# Patient Record
Sex: FEMALE | Race: WHITE
[De-identification: names, ages, dates, MRNs, and addresses within clinical notes are randomized per-mention and may not be internally consistent; named-entity substitution may affect disease eponyms.]

---

## 2019-04-14 ENCOUNTER — HOSPITAL ENCOUNTER (EMERGENCY)
Dept: HOSPITAL 7 - FB.ED | Age: 64
Discharge: HOME | End: 2019-04-14
Payer: COMMERCIAL
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
## 2019-04-14 DIAGNOSIS — S92.491A: Primary | ICD-10-CM

## 2019-04-14 NOTE — EDM.PDOC
ED HPI GENERAL MEDICAL PROBLEM





- General


Chief Complaint: Lower Extremity Injury/Pain


Stated Complaint: FALL


Time Seen by Provider: 04/14/19 14:45


Source of Information: Reports: Patient, Family


History Limitations: Reports: No Limitations





- History of Present Illness


INITIAL COMMENTS - FREE TEXT/NARRATIVE: 





64 y.o.w.f came with her  to the ed after she injured her right big toe, 

being concerned about a fx. Pt is able to move the toe but it hurst to move it. 

Pt did not take painmeds PTA. No other acute med issues. /95 RR 18 Pulse 

ox 97% von RA Pulse 87 Temp 36.7


Onset Date: 04/14/19


Onset Time: 14:00


Duration: Hour(s):


Location: Reports: Lower Extremity, Right (big toe)


Quality: Reports: Burning, Dull


Severity: Mild


Improves with: Reports: Rest


Worsens with: Reports: Movement


Context: Reports: Trauma


Associated Symptoms: Reports: No Other Symptoms





- Related Data


 Allergies











Allergy/AdvReac Type Severity Reaction Status Date / Time


 


No Known Allergies Allergy   Verified 04/14/19 14:50











Home Meds: 


 Home Meds





Citalopram Hydrobromide [Celexa] 10 mg PO DAILY 04/14/19 [History]


amLODIPine [Norvasc] 5 mg PO DAILY 04/14/19 [History]











Review of Systems





- Review of Systems


Review Of Systems: See Below


Constitutional: Reports: No Symptoms


Eyes: Reports: No Symptoms


Ears: Reports: No Symptoms


Nose: Reports: No Symptoms


Mouth/Throat: Reports: No Symptoms


Respiratory: Reports: No Symptoms


Cardiovascular: Reports: No Symptoms


GI/Abdominal: Reports: No Symptoms


Genitourinary: Reports: No Symptoms


Musculoskeletal: Reports: Other (big toe pain)


Skin: Reports: No Symptoms


Neurological: Reports: No Symptoms


Psychiatric: Reports: No Symptoms





ED EXAM, GENERAL





- Physical Exam


Exam: See Below


Exam Limited By: No Limitations


General Appearance: Alert, WD/WN, Mild Distress


Eye Exam: Bilateral Eye: Normal Inspection


Ears: Normal External Exam


Ear Exam: Bilateral Ear: Auricle Normal


Nose: Normal Inspection, Normal Mucosa


Throat/Mouth: Normal Inspection, Normal Lips, Normal Voice, No Airway Compromise


Head: Atraumatic, Normocephalic


Neck: Normal Inspection, Supple, Non-Tender, Full Range of Motion


Respiratory/Chest: No Respiratory Distress, Lungs Clear, Normal Breath Sounds, 

No Accessory Muscle Use, Chest Non-Tender


Cardiovascular: Normal Peripheral Pulses, Regular Rate, Rhythm, No Edema


GI/Abdominal: Normal Bowel Sounds


 (Female) Exam: Deferred


Rectal (Female) Exam: Deferred


Back Exam: Normal Inspection, Full Range of Motion


Extremities: Normal Inspection, Normal Capillary Refill, Other (tender right 

big toe)


Neurological: Alert, Oriented, CN II-XII Intact, Normal Cognition, Abnormal 

Gait (due to right big toe pain)


Psychiatric: Normal Affect, Normal Mood


Skin Exam: Warm, Dry, Intact, Normal Color, No Rash


Lymphatic: No Adenopathy





Course





- Vital Signs


Text/Narrative:: 





64 y.o.w.f came with her  to the ed after she injured her right big toe, 

being concerned about a fx. Pt is able to move the toe but it hurst to move it. 

Pt did not take painmeds PTA. No other acute med issues. /95 RR 18 Pulse 

ox 97% von RA Pulse 87 Temp 36.7


PE: WDWN W F with right  big toe pain


Imaging: Right toe: Nondisplaced fx of middle phalanx


Impression: Fx middle phalanx right big toe, closed


Tx: Ice, Motrin


Reexam: Pt was to walk on her heals, did not want crutches


Plan: D/C with instructions


Last Recorded V/S: 


 Last Vital Signs











Temp  36.9 C   04/14/19 14:50


 


Pulse  73   04/14/19 15:53


 


Resp  17   04/14/19 15:53


 


BP  145/72 H  04/14/19 15:53


 


Pulse Ox  99   04/14/19 15:53














- Orders/Labs/Meds


Orders: 


 Active Orders 24 hr











 Category Date Time Status


 


 Cooling Warming Measures [RC] ASDIRECTED Care  04/14/19 15:21 Active


 


 Toes Great Toe Rt T5 [CR] Stat Exams  04/14/19 15:20 Taken


 


 Ice Bag [Ice Therapy] [OM.PC] Routine Oth  04/14/19 15:21 Ordered











Meds: 


Medications














Discontinued Medications














Generic Name Dose Route Start Last Admin





  Trade Name Freq  PRN Reason Stop Dose Admin


 


Ibuprofen  600 mg  04/14/19 15:47  04/14/19 16:00





  Motrin  PO  04/14/19 15:48  Not Given





  ONETIME STA   





     





     





     





     














Departure





- Departure


Time of Disposition: 15:44


Disposition: Home, Self-Care 01


Condition: Good


Clinical Impression: 


 Toe fracture, right








- Discharge Information


Instructions:  RICE for Routine Care of Injuries


Referrals: 


Anuradha Lockwood NP [Primary Care Provider] - 


Forms:  ED Department Discharge


Additional Instructions: 


Please take Motrin for pain, Ice to the affected area, please walk on your 

heals if possible, please f/u, come back if your symptoms get worse acutely.





- My Orders


Last 24 Hours: 


My Active Orders





04/14/19 15:20


Toes Great Toe Rt T5 [CR] Stat 





04/14/19 15:21


Cooling Warming Measures [RC] ASDIRECTED 


Ice Bag [Ice Therapy] [OM.PC] Routine 














- Assessment/Plan


Last 24 Hours: 


My Active Orders





04/14/19 15:20


Toes Great Toe Rt T5 [CR] Stat 





04/14/19 15:21


Cooling Warming Measures [RC] ASDIRECTED 


Ice Bag [Ice Therapy] [OM.PC] Routine

## 2019-04-15 NOTE — CR
INDICATION:  Trauma.



RIGHT GREAT TOE:  Three views of the right great toe with four images were 
obtained 04/14/19 - no comparisons.



There appears to be a transverse fracture through the distal metaphysis of the 
proximal phalanx of the great toe.  This is only seen on the AP views and 
lateral view.  There is no oblique view.  



There appears to be dorsal offset of distal fracture fragment of approximately 
3.8 mm.  



Further evaluation with additional views, such as an oblique, to further 
evaluate the fracture site, as well as possibly CT as necessary, may be helpful.



No other definite bone or joint abnormality of an acute nature was identified.



There is suggestion of a mild degree of degenerative change at the first 
metatarsophalangeal joint and the interphalangeal joint of the great toe.  

MTDD

## 2020-12-31 ENCOUNTER — HOSPITAL ENCOUNTER (EMERGENCY)
Dept: HOSPITAL 7 - FB.ED | Age: 65
LOS: 1 days | Discharge: NURSING, REHAB OR LONG-TERM CARE | End: 2021-01-01
Payer: COMMERCIAL

## 2020-12-31 DIAGNOSIS — I10: ICD-10-CM

## 2020-12-31 DIAGNOSIS — J44.9: ICD-10-CM

## 2020-12-31 DIAGNOSIS — N13.2: Primary | ICD-10-CM

## 2020-12-31 DIAGNOSIS — F32.9: ICD-10-CM

## 2020-12-31 DIAGNOSIS — F41.9: ICD-10-CM

## 2020-12-31 DIAGNOSIS — Z79.899: ICD-10-CM

## 2020-12-31 DIAGNOSIS — F17.200: ICD-10-CM

## 2020-12-31 NOTE — EDM.PDOC
ED HPI GENERAL MEDICAL PROBLEM





- General


Stated Complaint: VOMITTING


Time Seen by Provider: 12/31/20 22:10


Source of Information: Reports: Patient


History Limitations: Reports: No Limitations





- History of Present Illness


INITIAL COMMENTS - FREE TEXT/NARRATIVE: 


65-year-old female who reports that she was out with her  having a few 

cocktails and a night out and at approximately 8 PM she began to have pain in 

her right mid back that was a sharp pain and the pain then seemed to radiate to 

her right flank and into her right lower abdomen and has progressively worsened 

with time. She developed nausea and has had vomiting 2. The pain was up to an 

8/10 and she is brought to the emergency department by her  via private 

vehicle. She reports that she had a kidney stone about 10 years ago and the pain

now feels very much like when she had the kidney stone. She states that she felt

completely well prior to this. She has had no antecedent pains in her back or 

flank. She has had no fevers or chills. There has been no dysuria or hematuria. 

No diarrhea. At the time that I was evaluating the patient, I had already 

ordered an IV for the patient and I had ordered Toradol 30 mg and Zofran 4 mg 

IV. She is now reporting that her pain is a 1-2/10 and seems to be receding. She

also no longer has any nausea. There are no other associated signs or symptoms. 

There are no other modifying factors.


Onset: Today (8 PM)


Duration: Getting Worse (Before arrival but after pain medication seems to be 

getting better.)


Location: Reports: Back (And right flank and lower abdomen.)


Quality: Reports: Sharp, Stabbing


Severity: Moderate (to veer.)


Improves with: Reports: None


Worsens with: Reports: None


Context: Reports: Other (As above.)


Associated Symptoms: Reports: Nausea/Vomiting


Treatments PTA: Reports: Other (see below) (Nothing.)


  ** R upper abdomen/flank


Pain Score (Numeric/FACES): 8





- Related Data


                                    Allergies











Allergy/AdvReac Type Severity Reaction Status Date / Time


 


No Known Allergies Allergy   Verified 12/31/20 22:03











Home Meds: 


                                    Home Meds





amLODIPine [Norvasc] 5 mg PO DAILY 04/14/19 [History]


Cetirizine [ZyrTEC] 10 mg PO DAILY 12/31/20 [History]


Escitalopram Oxalate [Lexapro] 10 mg PO DAILY 12/31/20 [History]


Loratadine [Claritin] 10 mg PO DAILY 12/31/20 [History]


hydrOXYzine pamoate [Vistaril] 25 mg PO BID 12/31/20 [History]











Past Medical History


Cardiovascular History: Reports: Hypertension


Respiratory History: Reports: COPD


Genitourinary History: Reports: Renal Calculus


Psychiatric History: Reports: Anxiety, Depression





- Past Surgical History


Female  Surgical History: Reports: Lithotripsy/ESWL





Social & Family History





- Tobacco Use


Tobacco Use Status *Q: Current Every Day Tobacco User





- Caffeine Use


Caffeine Use: Reports: Coffee





- Alcohol Use


Alcohol Use History: Yes


Alcohol Use Frequency: Socially (Had several drinks tonight.)





- Living Situation & Occupation


Living situation: Reports: 


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





ED ROS GENERAL





- Review of Systems


Review Of Systems: See Below


Constitutional: Reports: No Symptoms


HEENT: Reports: No Symptoms


Respiratory: Reports: No Symptoms


Cardiovascular: Reports: No Symptoms


Endocrine: Reports: No Symptoms


GI/Abdominal: Reports: Abdominal Pain


: Reports: Flank Pain


Musculoskeletal: Reports: Back Pain


Skin: Reports: No Symptoms


Neurological: Reports: No Symptoms


Hematologic/Lymphatic: Reports: No Symptoms


Immunologic: Reports: No Symptoms





ED EXAM, GI/ABD





- Physical Exam


Exam: See Below


Exam Limited By: No Limitations


General Appearance: Alert, WD/WN, Moderate Distress


Eyes: Bilateral: Normal Appearance, EOMI


Ears: Normal External Exam, Hearing Grossly Normal


Nose: Normal Inspection, Normal Mucosa, No Blood


Throat/Mouth: Normal Lips, Normal Oropharynx, Normal Voice, No Airway Compromise


Head: Atraumatic, Normocephalic


Neck: Normal Inspection, Supple, Non-Tender, Full Range of Motion


Respiratory/Chest: No Respiratory Distress, Lungs Clear, Normal Breath Sounds, 

No Accessory Muscle Use, Chest Non-Tender


Cardiovascular: Normal Peripheral Pulses, Regular Rate, Rhythm, No Murmur


GI/Abdominal Exam: Normal Bowel Sounds, Soft, Non-Tender, No Mass


Back Exam: Normal Inspection, Full Range of Motion.  No: CVA Tenderness (R), CVA

 Tenderness (L)


Extremities: Normal Inspection, Normal Range of Motion, Non-Tender, No Pedal 

Edema, Normal Capillary Refill


Neurological: Alert, Oriented, CN II-XII Intact, Normal Cognition, No 

Motor/Sensory Deficits


Skin Exam: Warm, Dry, Intact, Normal Color, No Rash





Course





- Vital Signs


Last Recorded V/S: 


                                Last Vital Signs











Temp  36.3 C   12/31/20 21:53


 


Pulse  83   01/01/21 00:05


 


Resp  20   01/01/21 00:05


 


BP  125/56 L  01/01/21 00:05


 


Pulse Ox  97   01/01/21 00:05














- Orders/Labs/Meds


Orders: 


                               Active Orders 24 hr











 Category Date Time Status


 


 Abdomen Pelvis wo Cont [CT] Stat Exams  12/31/20 22:38 Taken


 


 Sodium Chloride 0.9% [Normal Saline] 1,000 ml Med  12/31/20 22:15 Active





 IV ASDIRECTED   


 


 Sodium Chloride 0.9% [Saline Flush] Med  12/31/20 22:10 Active





 10 ml FLUSH ASDIRECTED PRN   


 


 Peripheral IV Insertion Adult [OM.PC] Routine Oth  12/31/20 22:10 Ordered








                                Medication Orders





Sodium Chloride (Normal Saline)  1,000 mls @ 125 mls/hr IV ASDIRECTED SYLVIA


   Last Admin: 12/31/20 23:34  Dose: 125 mls/hr


   Documented by: JOHANN


Sodium Chloride (Saline Flush)  10 ml FLUSH ASDIRECTED PRN


   PRN Reason: Keep Vein Open


   Last Admin: 12/31/20 22:20 Dose:  10 ml


   Documented by: 








Labs: 


                                Laboratory Tests











  12/31/20 12/31/20 12/31/20 Range/Units





  22:00 22:20 22:20 


 


WBC   9.1   (3.0-10.3)  x10-3/uL


 


RBC   4.23   (3.60-5.20)  x10(6)uL


 


Hgb   13.4   (11.4-15.5)  g/dL


 


Hct   40.7   (34.2-48.2)  %


 


MCV   96.2   (76.7-100.5)  fL


 


MCH   31.7   (23.9-33.9)  pg


 


MCHC   33.0   (31.9-34.8)  g/dL


 


RDW   12.8   (12.3-16.5)  %


 


Plt Count   288   (151-488)  x10(3)uL


 


MPV   8.2   (7.1-12.4)  fL


 


Neut % (Auto)   61.1   (30.8-76.2)  %


 


Lymph % (Auto)   25.7   (18.4-52.1)  %


 


Mono % (Auto)   7.9   (4.4-15.7)  %


 


Eos % (Auto)   4.6   (0.6-8.1)  %


 


Baso % (Auto)   0.7   (0.2-1.5)  %


 


Neut # (Auto)   5.5   (1.5-6.3)  x10-3/uL


 


Lymph # (Auto)   2.3   (1.0-4.4)  x10-3/uL


 


Mono # (Auto)   0.7   (0.3-1.0)  x10-3/uL


 


Eos # (Auto)   0.4   (0.0-0.8)  x10-3/uL


 


Baso # (Auto)   0.1   (0.0-0.1)  x10-3/uL


 


Sodium    144  (135-145)  mmol/L


 


Potassium    3.4 L  (3.5-5.3)  mmol/L


 


Chloride    106  (100-110)  mmol/L


 


Carbon Dioxide    27  (21-32)  mmol/L


 


BUN    9  (7-18)  mg/dL


 


Creatinine    0.7  (0.55-1.02)  mg/dL


 


Est Cr Clr Drug Dosing    67.15  mL/min


 


Estimated GFR (MDRD)    > 60  (>60)  


 


BUN/Creatinine Ratio    12.9  (9-20)  


 


Glucose    124 H  ()  mg/dL


 


Calcium    8.7  (8.6-10.2)  mg/dL


 


Magnesium    2.2  (1.8-2.5)  mg/dL


 


Total Bilirubin    0.2  (0.1-1.3)  mg/dL


 


AST    17  (5-25)  IU/L


 


ALT    20  (12-36)  U/L


 


Alkaline Phosphatase    47 L  ()  IU/L


 


C-Reactive Protein     (0.5-0.9)  mg/dL


 


Total Protein    6.9  (6.0-8.0)  g/dL


 


Albumin    4.0  (3.2-4.6)  g/dL


 


Globulin    2.9  g/dL


 


Albumin/Globulin Ratio    1.4  


 


Lipase     ()  U/L


 


Urine Color  Yellow    (YELLOW)  


 


Urine Appearance  Clear    (CLEAR)  


 


Urine pH  5.0    (5.0-6.5)  


 


Ur Specific Gravity  1.020    (1.010-1.025)  


 


Urine Protein  30 H    (NEGATIVE)  mg/dL


 


Urine Glucose (UA)  Normal    (NORMAL)  mg/dL


 


Urine Ketones  15 H    (NEGATIVE)  mg/dL


 


Urine Occult Blood  Large H    (NEGATIVE)  


 


Urine Nitrite  Negative    (NEGATIVE)  


 


Urine Bilirubin  Negative    (NEGATIVE)  


 


Urine Urobilinogen  Normal    (NEGATIVE)  mg/dL


 


Ur Leukocyte Esterase  Negative    (NEGATIVE)  


 


Urine RBC  20-30 H    (0-5)  


 


Urine WBC  0-5    (0-5)  


 


Ur Squamous Epith Cells  Few H    (NS,R,O)  


 


Urine Bacteria  Few H    (NS)  


 


Ethyl Alcohol     (<0.03)  %














  12/31/20 Range/Units





  22:20 


 


WBC   (3.0-10.3)  x10-3/uL


 


RBC   (3.60-5.20)  x10(6)uL


 


Hgb   (11.4-15.5)  g/dL


 


Hct   (34.2-48.2)  %


 


MCV   (76.7-100.5)  fL


 


MCH   (23.9-33.9)  pg


 


MCHC   (31.9-34.8)  g/dL


 


RDW   (12.3-16.5)  %


 


Plt Count   (151-488)  x10(3)uL


 


MPV   (7.1-12.4)  fL


 


Neut % (Auto)   (30.8-76.2)  %


 


Lymph % (Auto)   (18.4-52.1)  %


 


Mono % (Auto)   (4.4-15.7)  %


 


Eos % (Auto)   (0.6-8.1)  %


 


Baso % (Auto)   (0.2-1.5)  %


 


Neut # (Auto)   (1.5-6.3)  x10-3/uL


 


Lymph # (Auto)   (1.0-4.4)  x10-3/uL


 


Mono # (Auto)   (0.3-1.0)  x10-3/uL


 


Eos # (Auto)   (0.0-0.8)  x10-3/uL


 


Baso # (Auto)   (0.0-0.1)  x10-3/uL


 


Sodium   (135-145)  mmol/L


 


Potassium   (3.5-5.3)  mmol/L


 


Chloride   (100-110)  mmol/L


 


Carbon Dioxide   (21-32)  mmol/L


 


BUN   (7-18)  mg/dL


 


Creatinine   (0.55-1.02)  mg/dL


 


Est Cr Clr Drug Dosing   mL/min


 


Estimated GFR (MDRD)   (>60)  


 


BUN/Creatinine Ratio   (9-20)  


 


Glucose   ()  mg/dL


 


Calcium   (8.6-10.2)  mg/dL


 


Magnesium   (1.8-2.5)  mg/dL


 


Total Bilirubin   (0.1-1.3)  mg/dL


 


AST   (5-25)  IU/L


 


ALT   (12-36)  U/L


 


Alkaline Phosphatase   ()  IU/L


 


C-Reactive Protein  0.3 L  (0.5-0.9)  mg/dL


 


Total Protein   (6.0-8.0)  g/dL


 


Albumin   (3.2-4.6)  g/dL


 


Globulin   g/dL


 


Albumin/Globulin Ratio   


 


Lipase  153  ()  U/L


 


Urine Color   (YELLOW)  


 


Urine Appearance   (CLEAR)  


 


Urine pH   (5.0-6.5)  


 


Ur Specific Gravity   (1.010-1.025)  


 


Urine Protein   (NEGATIVE)  mg/dL


 


Urine Glucose (UA)   (NORMAL)  mg/dL


 


Urine Ketones   (NEGATIVE)  mg/dL


 


Urine Occult Blood   (NEGATIVE)  


 


Urine Nitrite   (NEGATIVE)  


 


Urine Bilirubin   (NEGATIVE)  


 


Urine Urobilinogen   (NEGATIVE)  mg/dL


 


Ur Leukocyte Esterase   (NEGATIVE)  


 


Urine RBC   (0-5)  


 


Urine WBC   (0-5)  


 


Ur Squamous Epith Cells   (NS,R,O)  


 


Urine Bacteria   (NS)  


 


Ethyl Alcohol  0.10 H  (<0.03)  %











Meds: 


Medications











Generic Name Dose Route Start Last Admin





  Trade Name Freq  PRN Reason Stop Dose Admin


 


Sodium Chloride  1,000 mls @ 125 mls/hr  12/31/20 22:15  12/31/20 23:34





  Normal Saline  IV   125 mls/hr





  ASDIRECTED SYLVIA   Administration


 


Sodium Chloride  10 ml  12/31/20 22:10  12/31/20 22:20





  Saline Flush  FLUSH   10 ml





  ASDIRECTED PRN   Administration





  Keep Vein Open  














Discontinued Medications














Generic Name Dose Route Start Last Admin





  Trade Name Freq  PRN Reason Stop Dose Admin


 


Sodium Chloride  1,000 mls @ 999 mls/hr  12/31/20 22:11  12/31/20 22:30





  Normal Saline  IV  12/31/20 23:11  999 mls/hr





  .BOLUS ONE   Administration


 


Ketorolac Tromethamine  30 mg  12/31/20 22:11  12/31/20 22:20





  Toradol  IVPUSH  12/31/20 22:12  30 mg





  ONETIME ONE   Administration


 


Ondansetron HCl  4 mg  12/31/20 22:11  12/31/20 22:22





  Zofran  IVPUSH  12/31/20 22:12  4 mg





  ONETIME ONE   Administration


 


Tamsulosin HCl  0.4 mg  01/01/21 00:23  01/01/21 00:35





  Flomax  PO  01/01/21 00:24  0.4 mg





  ONETIME ONE   Administration














- Radiology Interpretation


Free Text/Narrative:: 





CT scan of abdomen and pelvis without IV contrast showed an obstructive 5 mm 

calculus in the proximal to mid right ureter with proximal ureterectasis and 

moderate severity right sided hydronephrosis. This was per the [facility] radiologist





- Re-Assessments/Exams


Free Text/Narrative Re-Assessment/Exam: 





12/31/20 22:25: Patient's blood work is reassuringly normal. The urinalysis 

shows blood but no evidence of infection. Her pain is now down to a 2/10 and she

 has no nausea. I will order a CT scan of her abdomen and pelvis without IV 

contrast.





12/31/20 23:50: The patient feels much improved. She has no pain now. No nausea.

 The CT scan of her abdominal and pelvis without IV contrast showed a 5 mm stone

 in the proximal/mid ureter with obstructive uropathy. Her urine showed no 

evidence of infection. She appears nontoxic now. I discussed all of this with 

the patient and she will discuss this with her . I will order Flomax 0.4 

mg by mouth now. The patient may need admission for pain control. There are no 

beds available at [facility] at this time. In addition she may need 

urologic specially services and those are not available at [facility].








01/01/21 00:50: The patient wishes me to discuss her case with the doctors at 

[facility] in [place]. She is very concerned about recurring pain. Therefore, I will 

call and discuss the patient's case with the doctors at [facility] in [place].





01/01/21 00:55: I called [facility] and they will have to get the 

hospitalist to call me back.





01/01/21 01:20: I discussed the patient's case with Dr. Tobin, hospitalist 

at [facility] in [place], and he has agreed to accept the patient in transfer. I 

discussed this with the patient and she is in agreement with this plan.








Departure





- Departure


Time of Disposition: 01:32


Disposition: DC/Tfer to Acute Hospital 02


Condition: Good (Stable.)


Clinical Impression: 


 Right ureteral calculus, Obstructive uropathy








- Discharge Information


Referrals: 


PCP,None [Primary Care Provider] - 





Sepsis Event Note (ED)





- Focused Exam


Vital Signs: 


                                   Vital Signs











  Temp Pulse Resp BP Pulse Ox


 


 01/01/21 00:05   83  20  125/56 L  97


 


 12/31/20 21:53  36.3 C  74  20  124/69  97














- My Orders


Last 24 Hours: 


My Active Orders





12/31/20 22:10


Sodium Chloride 0.9% [Saline Flush]   10 ml FLUSH ASDIRECTED PRN 


Peripheral IV Insertion Adult [OM.PC] Routine 





12/31/20 22:15


Sodium Chloride 0.9% [Normal Saline] 1,000 ml IV ASDIRECTED 





12/31/20 22:38


Abdomen Pelvis wo Cont [CT] Stat 














- Assessment/Plan


Last 24 Hours: 


My Active Orders





12/31/20 22:10


Sodium Chloride 0.9% [Saline Flush]   10 ml FLUSH ASDIRECTED PRN 


Peripheral IV Insertion Adult [OM.PC] Routine 





12/31/20 22:15


Sodium Chloride 0.9% [Normal Saline] 1,000 ml IV ASDIRECTED 





12/31/20 22:38


Abdomen Pelvis wo Cont [CT] Stat

## 2022-11-21 ENCOUNTER — HOSPITAL ENCOUNTER (EMERGENCY)
Dept: HOSPITAL 7 - FB.ED | Age: 67
Discharge: HOME | End: 2022-11-21
Payer: MEDICARE
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
## 2022-11-21 DIAGNOSIS — J44.9: ICD-10-CM

## 2022-11-21 DIAGNOSIS — S52.502A: Primary | ICD-10-CM

## 2022-11-21 DIAGNOSIS — I10: ICD-10-CM

## 2022-11-21 DIAGNOSIS — Z79.899: ICD-10-CM

## 2023-04-17 ENCOUNTER — HOSPITAL ENCOUNTER (OUTPATIENT)
Dept: HOSPITAL 7 - FB.SDS | Age: 68
Discharge: HOME | End: 2023-04-17
Attending: SURGERY
Payer: MEDICARE

## 2023-04-17 DIAGNOSIS — Q43.8: ICD-10-CM

## 2023-04-17 DIAGNOSIS — K63.5: ICD-10-CM

## 2023-04-17 DIAGNOSIS — Z79.899: ICD-10-CM

## 2023-04-17 DIAGNOSIS — I10: ICD-10-CM

## 2023-04-17 DIAGNOSIS — K64.2: ICD-10-CM

## 2023-04-17 DIAGNOSIS — D12.8: Primary | ICD-10-CM

## 2023-04-17 DIAGNOSIS — F17.210: ICD-10-CM

## 2023-04-17 DIAGNOSIS — F41.9: ICD-10-CM

## 2023-04-17 PROCEDURE — 00811 ANES LWR INTST NDSC NOS: CPT

## 2023-04-17 PROCEDURE — 88305 TISSUE EXAM BY PATHOLOGIST: CPT

## 2023-04-17 PROCEDURE — 45385 COLONOSCOPY W/LESION REMOVAL: CPT
